# Patient Record
Sex: FEMALE | Race: BLACK OR AFRICAN AMERICAN | URBAN - METROPOLITAN AREA
[De-identification: names, ages, dates, MRNs, and addresses within clinical notes are randomized per-mention and may not be internally consistent; named-entity substitution may affect disease eponyms.]

---
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
## 2021-07-30 ENCOUNTER — OFFICE VISIT (OUTPATIENT)
Dept: PEDIATRIC ENDOCRINOLOGY | Age: 21
End: 2021-07-30
Payer: COMMERCIAL

## 2021-07-30 VITALS
BODY MASS INDEX: 34.5 KG/M2 | HEART RATE: 100 BPM | SYSTOLIC BLOOD PRESSURE: 123 MMHG | TEMPERATURE: 98.7 F | DIASTOLIC BLOOD PRESSURE: 82 MMHG | HEIGHT: 67 IN | WEIGHT: 219.8 LBS

## 2021-07-30 DIAGNOSIS — E66.9 OBESITY (BMI 30-39.9): ICD-10-CM

## 2021-07-30 DIAGNOSIS — R73.09 ELEVATED HEMOGLOBIN A1C: Primary | ICD-10-CM

## 2021-07-30 LAB — HBA1C MFR BLD HPLC: 5.5 %

## 2021-07-30 PROCEDURE — 83036 HEMOGLOBIN GLYCOSYLATED A1C: CPT | Performed by: STUDENT IN AN ORGANIZED HEALTH CARE EDUCATION/TRAINING PROGRAM

## 2021-07-30 PROCEDURE — 99215 OFFICE O/P EST HI 40 MIN: CPT | Performed by: STUDENT IN AN ORGANIZED HEALTH CARE EDUCATION/TRAINING PROGRAM

## 2021-07-30 NOTE — PROGRESS NOTES
Cc:Abnormal weight gain/elevated Hba1c    [facility]:  Patient is here for follow up of insulin resistance,elevated Hba1c  Last clinic visit: 7/31/2020. No major illness/ER visits since last clinic visit. Diet: Patient food choices:  vegetarian diet . More vegetables. Sugary drinks: mostly water  Physical activity: Not much active  Medication: metformin 1000 mg BID  Side effects:no  Compliance:good  Puberty: irregular periods on OCP  Signs of diabetes:no  Other signs of insulin resistance: acanthosis  Grade in school: in [place]    Diet:  Portion size: decreased  Soda: Yes  Juice: Yes  Cookies: decreased  Chips: Yes    Screen time: Slightly increased        ROS/PMH/Social/Family history: no change since last visit dated:   Objective:     Visit Vitals  /82 (BP 1 Location: Left upper arm, BP Patient Position: Sitting, BP Cuff Size: Large adult)   Pulse 100   Temp 98.7 °F (37.1 °C) (Oral)   Ht 5' 7\" (1.702 m)   Wt 219 lb 12.8 oz (99.7 kg)   LMP 07/26/2021   BMI 34.43 kg/m²     Wt Readings from Last 3 Encounters:   07/30/21 219 lb 12.8 oz (99.7 kg)   07/31/20 211 lb 3.2 oz (95.8 kg) (98 %, Z= 2.10)*   12/18/19 226 lb (102.5 kg) (99 %, Z= 2.26)*     * Growth percentiles are based on CDC (Girls, 2-20 Years) data. Ht Readings from Last 3 Encounters:   07/30/21 5' 7\" (1.702 m)   07/31/20 5' 7.56\" (1.716 m) (90 %, Z= 1.29)*   12/18/19 5' 6.54\" (1.69 m) (81 %, Z= 0.89)*     * Growth percentiles are based on CDC (Girls, 2-20 Years) data. Body mass index is 34.43 kg/m². Facility age limit for growth percentiles is 20 years. Facility age limit for growth percentiles is 20 years. Facility age limit for growth percentiles is 20 years. Physical Exam:   General: alert, in no distress  Eyes: conjunctiva clear, fundi benign  ENT: dentition good, MMM  Neck supple, trachea midline, no lymphadenopathy  Thyroid:  Normal in size and texture.   No nodules palpated  Chest: clear bilaterally  Abdomen: soft, non tender without mass or organomegaly  Extremities: without deformity  Neuro:no focal deficits  Skin: acanthosis  : Rai 5. Irregular periods. On OCP. Mild hirsutism: Lower abdomen, lower back. Change in weight: decrease by 6.7kg in last 7months   Labs:   Lab Results   Component Value Date/Time    Hemoglobin A1c (POC) 5.5 07/30/2021 10:04 AM                  Lab Results   Component Value Date/Time    TSH 1.480 07/29/2016 11:18 AM                  Lab Results   Component Value Date/Time    Cholesterol, total 159 05/26/2017 07:51 AM    HDL Cholesterol 45 05/26/2017 07:51 AM    LDL, calculated 92 05/26/2017 07:51 AM    VLDL, calculated 22 05/26/2017 07:51 AM    Triglyceride 112 (H) 05/26/2017 07:51 AM       A/P:          Dysmetabolic syndrome. Exam today is unremarkable. Reports interval decrease activity as well as having some sugary drinks and increased carbs. Interval weight gain of 3.9 kg in the last year. Point-of-care hemoglobin A1c today was 5.5%. It was 5.4% about a year ago. Stressed the importance of making dietary and lifestyle changes. Reduce sugary drinks, reduce carbs, reduce chips and cookies, increase vegetables, increase activity. We will send some screening labs today. We will give Pauline to discuss the results as well as further management plan. Continue metformin 1000 mg twice daily. Like to see her back in clinic in 5 months or sooner if any concerns. If continual decrease in weight as well as normal hemoglobin A1c will discuss weaning metformin dose at the next clinic visit. Irregular periods/no hirsutism: Was seen and evaluated by OB/GYN. Continues on OCPs prescribed by her OB/GYN. She will continue follow-up of OB/GYN. Plan:  1. Reviewed exercise goals  Reviewed dietary changes:Portion size discussed and importance of eliminating fried foods and healthy choices discussed. 2. Screen time:  1 hour week day and 2 hours per day on week ends.             Sleep duration: 8-10 hours of sleep        3. Reviewed the signs and symptoms of diabetes  4. Reviewed the pathophysiology and natural history of insulin resistance  5. Co morbidities of obesity explained: risk for hypertension, high cholesterol,   6. Metformin dose reviewed and side effects of metfomin    a)Provided traffic light diet literature  b) Reviewed diet and exercise plan. :60 minutes/ day after school on week days and 60 minutes x 2 on weekends. c) Co-morbidities of obesity including : diabetes, gallbladder disease, heartburn, heart disease, high cholesterol, high blood pressure, osteoarthritis, psychological depression, sleep apnea and stroke reviewed. d) Reviewed the symptoms of diabetes (polyuria, polydipsia)  e) 3 meals and 2 snacks and importance of starting the day with breakfast stressed and to have small amounts more frequently to help with metabolism  f) Limit screen time to 1hour per day on weekdays and 2 hours on weekends.   g) Follow up in 4 month       Orders Placed This Encounter    METABOLIC PANEL, COMPREHENSIVE     Standing Status:   Future     Number of Occurrences:   1     Standing Expiration Date:   7/30/2022    VITAMIN D, 25 HYDROXY     Standing Status:   Future     Number of Occurrences:   1     Standing Expiration Date:   7/30/2022    AMB POC HEMOGLOBIN A1C         Orders Placed This Encounter    METABOLIC PANEL, COMPREHENSIVE     Standing Status:   Future     Number of Occurrences:   1     Standing Expiration Date:   7/30/2022    VITAMIN D, 25 HYDROXY     Standing Status:   Future     Number of Occurrences:   1     Standing Expiration Date:   7/30/2022    AMB POC HEMOGLOBIN A1C         Total Time: 40 minutes  Time spent in counseling more than 50%      Pauline's cell: [phone]

## 2021-07-30 NOTE — PROGRESS NOTES
Chief Complaint   Patient presents with    Follow-up    Weight Management        Visit Vitals  /82 (BP 1 Location: Left upper arm, BP Patient Position: Sitting, BP Cuff Size: Large adult)   Pulse 100   Temp 98.7 °F (37.1 °C) (Oral)   Ht 5' 7\" (1.702 m)   Wt 219 lb 12.8 oz (99.7 kg)   BMI 34.43 kg/m²

## 2021-07-30 NOTE — LETTER
7/30/2021    Patient: Oseas Garcia   YOB: 2000   Date of Visit: 7/30/2021     [place]  [id number] [facility] [id number]  Via Fax: [phone]    Dear Claire Bosworth, MD,      Thank you for referring Ms. Pauline Bailey to [facility] for evaluation. My notes for this consultation are attached. Chief Complaint   Patient presents with    Follow-up    Weight Management        Visit Vitals  /82 (BP 1 Location: Left upper arm, BP Patient Position: Sitting, BP Cuff Size: Large adult)   Pulse 100   Temp 98.7 °F (37.1 °C) (Oral)   Ht 5' 7\" (1.702 m)   Wt 219 lb 12.8 oz (99.7 kg)   BMI 34.43 kg/m²             Cc:Abnormal weight gain/elevated Hba1c    \[facility] [place] [facility]\"":  Patient is here for follow up of insulin resistance,elevated Hba1c  Last clinic visit: 7/31/2020. No major illness/ER visits since last clinic visit. Diet: Patient food choices:  vegetarian diet . More vegetables.    Sugary drinks: mostly water  Physical activity: Not much active  Medication: metformin 1000 mg BID  Side effects:no  Compliance:good  Puberty: irregular periods on OCP  Signs of diabetes:no  Other signs of insulin resistance: acanthosis  Grade in school: in [employer]    Diet:  Portion size: decreased  Soda: Yes  Juice: Yes  Cookies: decreased  Chips: Yes    Screen time: Slightly increased        ROS/PMH/Social/Family history: no change since last visit dated:   Objective:     Visit Vitals  /82 (BP 1 Location: Left upper arm, BP Patient Position: Sitting, BP Cuff Size: Large adult)   Pulse 100   Temp 98.7 °F (37.1 °C) (Oral)   Ht 5' 7\" (1.702 m)   Wt 219 lb 12.8 oz (99.7 kg)   LMP 07/26/2021   BMI 34.43 kg/m²     Wt Readings from Last 3 Encounters:   07/30/21 219 lb 12.8 oz (99.7 kg)   07/31/20 211 lb 3.2 oz (95.8 kg) (98 %, Z= 2.10)*   12/18/19 226 lb (102.5 kg) (99 %, Z= 2.26)*     * Growth percentiles are based on CDC (Girls, 2-20 Years) data. Ht Readings from Last 3 Encounters:   07/30/21 5' 7\" (1.702 m)   07/31/20 5' 7.56\" (1.716 m) (90 %, Z= 1.29)*   12/18/19 5' 6.54\" (1.69 m) (81 %, Z= 0.89)*     * Growth percentiles are based on [facility] (Girls, 2-20 Years) data. Body mass index is 34.43 kg/m². Facility age limit for growth percentiles is 20 years. Facility age limit for growth percentiles is 20 years. Facility age limit for growth percentiles is 20 years. Physical Exam:   General: alert, in no distress  Eyes: conjunctiva clear, fundi benign  ENT: dentition good, MMM  Neck supple, trachea midline, no lymphadenopathy  Thyroid:  Normal in size and texture. No nodules palpated  Chest: clear bilaterally  Abdomen: soft, non tender without mass or organomegaly  Extremities: without deformity  Neuro:no focal deficits  Skin: acanthosis  : Rai 5. Irregular periods. On OCP. Mild hirsutism: Lower abdomen, lower back. Change in weight: decrease by 6.7kg in last 7months   Labs:   Lab Results   Component Value Date/Time    Hemoglobin A1c (POC) 5.5 07/30/2021 10:04 AM                  Lab Results   Component Value Date/Time    TSH 1.480 07/29/2016 11:18 AM                  Lab Results   Component Value Date/Time    Cholesterol, total 159 05/26/2017 07:51 AM    HDL Cholesterol 45 05/26/2017 07:51 AM    LDL, calculated 92 05/26/2017 07:51 AM    VLDL, calculated 22 05/26/2017 07:51 AM    Triglyceride 112 (H) 05/26/2017 07:51 AM       A/P:          Dysmetabolic syndrome. Exam today is unremarkable. Reports interval decrease activity as well as having some sugary drinks and increased carbs. Interval weight gain of 3.9 kg in the last year. Point-of-care hemoglobin A1c today was 5.5%. It was 5.4% about a year ago. Stressed the importance of making dietary and lifestyle changes. Reduce sugary drinks, reduce carbs, reduce chips and cookies, increase vegetables, increase activity. We will send some screening labs today.   We will give Pauline to discuss the results as well as further management plan. Continue metformin 1000 mg twice daily. Like to see her back in clinic in 5 months or sooner if any concerns. If continual decrease in weight as well as normal hemoglobin A1c will discuss weaning metformin dose at the next clinic visit. Irregular periods/no hirsutism: Was seen and evaluated by OB/GYN. Continues on OCPs prescribed by her OB/GYN. She will continue follow-up of OB/GYN. Plan:  1. Reviewed exercise goals  Reviewed dietary changes:Portion size discussed and importance of eliminating fried foods and healthy choices discussed. 2. Screen time:  1 hour week day and 2 hours per day on week ends. Sleep duration: 8-10 hours of sleep        3. Reviewed the signs and symptoms of diabetes  4. Reviewed the pathophysiology and natural history of insulin resistance  5. Co morbidities of obesity explained: risk for hypertension, high cholesterol,   6. Metformin dose reviewed and side effects of metfomin    a)Provided traffic light diet literature  b) Reviewed diet and exercise plan. :60 minutes/ day after school on week days and 60 minutes x 2 on weekends. c) Co-morbidities of obesity including : diabetes, gallbladder disease, heartburn, heart disease, high cholesterol, high blood pressure, osteoarthritis, psychological depression, sleep apnea and stroke reviewed. d) Reviewed the symptoms of diabetes (polyuria, polydipsia)  e) 3 meals and 2 snacks and importance of starting the day with breakfast stressed and to have small amounts more frequently to help with metabolism  f) Limit screen time to 1hour per day on weekdays and 2 hours on weekends.   g) Follow up in 4 month       Orders Placed This Encounter    METABOLIC PANEL, COMPREHENSIVE     Standing Status:   Future     Number of Occurrences:   1     Standing Expiration Date:   7/30/2022    VITAMIN D, 25 HYDROXY     Standing Status:   Future     Number of Occurrences:   1     Standing Expiration Date:   7/30/2022    AMB POC HEMOGLOBIN A1C         Orders Placed This Encounter    METABOLIC PANEL, COMPREHENSIVE     Standing Status:   Future     Number of Occurrences:   1     Standing Expiration Date:   7/30/2022    VITAMIN D, 25 HYDROXY     Standing Status:   Future     Number of Occurrences:   1     Standing Expiration Date:   7/30/2022    AMB POC HEMOGLOBIN A1C         Total Time: 40 minutes  Time spent in counseling more than 50%      Pauline's cell: [phone]        If you have questions, please do not hesitate to call me. I look forward to following your patient along with you.       Sincerely,    Ines Espinal MD

## 2021-07-31 LAB
25(OH)D3+25(OH)D2 SERPL-MCNC: 29.1 NG/ML (ref 30–100)
ALBUMIN SERPL-MCNC: 4.7 G/DL (ref 3.9–5)
ALBUMIN/GLOB SERPL: 1.7 {RATIO} (ref 1.2–2.2)
ALP SERPL-CCNC: 52 IU/L (ref 45–106)
ALT SERPL-CCNC: 12 IU/L (ref 0–32)
AST SERPL-CCNC: 12 IU/L (ref 0–40)
BILIRUB SERPL-MCNC: 0.3 MG/DL (ref 0–1.2)
BUN SERPL-MCNC: 10 MG/DL (ref 6–20)
BUN/CREAT SERPL: 12 (ref 9–23)
CALCIUM SERPL-MCNC: 10 MG/DL (ref 8.7–10.2)
CHLORIDE SERPL-SCNC: 102 MMOL/L (ref 96–106)
CO2 SERPL-SCNC: 21 MMOL/L (ref 20–29)
CREAT SERPL-MCNC: 0.83 MG/DL (ref 0.57–1)
GLOBULIN SER CALC-MCNC: 2.7 G/DL (ref 1.5–4.5)
GLUCOSE SERPL-MCNC: 90 MG/DL (ref 65–99)
POTASSIUM SERPL-SCNC: 4.9 MMOL/L (ref 3.5–5.2)
PROT SERPL-MCNC: 7.4 G/DL (ref 6–8.5)
SODIUM SERPL-SCNC: 140 MMOL/L (ref 134–144)

## 2022-03-19 PROBLEM — E66.01 SEVERE OBESITY (HCC): Status: ACTIVE | Noted: 2019-07-17

## 2022-03-19 PROBLEM — N92.6 IRREGULAR PERIODS: Status: ACTIVE | Noted: 2019-12-18

## 2022-03-20 PROBLEM — E66.9 OBESITY (BMI 30-39.9): Status: ACTIVE | Noted: 2017-12-08

## 2023-05-25 RX ORDER — NORETHINDRONE ACETATE AND ETHINYL ESTRADIOL, ETHINYL ESTRADIOL AND FERROUS FUMARATE 1MG-10(24)
1 KIT ORAL DAILY
COMMUNITY
Start: 2019-05-08